# Patient Record
Sex: FEMALE | Race: WHITE | Employment: UNEMPLOYED | URBAN - METROPOLITAN AREA
[De-identification: names, ages, dates, MRNs, and addresses within clinical notes are randomized per-mention and may not be internally consistent; named-entity substitution may affect disease eponyms.]

---
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
## 2022-07-08 LAB
C. TRACHOMATIS, EXTERNAL RESULT: NEGATIVE
N. GONORRHOEAE, EXTERNAL RESULT: NEGATIVE

## 2022-08-19 LAB
ABO, EXTERNAL RESULT: NORMAL
HEP B, EXTERNAL RESULT: NEGATIVE
HIV, EXTERNAL RESULT: NEGATIVE
RH FACTOR, EXTERNAL RESULT: POSITIVE
RPR, EXTERNAL RESULT: NON REACTIVE
RUBELLA TITER, EXTERNAL RESULT: NORMAL

## 2023-02-10 LAB — GBS, EXTERNAL RESULT: POSITIVE

## 2023-03-03 ENCOUNTER — HOSPITAL ENCOUNTER (INPATIENT)
Age: 23
LOS: 4 days | Discharge: HOME | End: 2023-03-07
Attending: OBSTETRICS & GYNECOLOGY | Admitting: OBSTETRICS & GYNECOLOGY
Payer: COMMERCIAL

## 2023-03-03 ENCOUNTER — ANESTHESIA EVENT (OUTPATIENT)
Dept: LABOR AND DELIVERY | Age: 23
End: 2023-03-03

## 2023-03-03 ENCOUNTER — ANESTHESIA (OUTPATIENT)
Dept: LABOR AND DELIVERY | Age: 23
End: 2023-03-03

## 2023-03-03 PROBLEM — O13.3 GESTATIONAL HYPERTENSION W/O SIGNIFICANT PROTEINURIA IN 3RD TRIMESTER: Status: ACTIVE | Noted: 2023-03-03

## 2023-03-03 LAB
A/G RATIO: 1.2 (ref 1.1–2.2)
ABO/RH: NORMAL
ALBUMIN SERPL-MCNC: 3.8 G/DL (ref 3.4–5)
ALP BLD-CCNC: 146 U/L (ref 40–129)
ALT SERPL-CCNC: 15 U/L (ref 10–40)
AMPHETAMINE SCREEN, URINE: NORMAL
ANION GAP SERPL CALCULATED.3IONS-SCNC: 13 MMOL/L (ref 3–16)
ANTIBODY SCREEN: NORMAL
APTT: 27.9 SEC (ref 23–34.3)
AST SERPL-CCNC: 12 U/L (ref 15–37)
BARBITURATE SCREEN URINE: NORMAL
BENZODIAZEPINE SCREEN, URINE: NORMAL
BILIRUB SERPL-MCNC: 1.2 MG/DL (ref 0–1)
BUN BLDV-MCNC: 6 MG/DL (ref 7–20)
BUPRENORPHINE URINE: NORMAL
CALCIUM SERPL-MCNC: 9.3 MG/DL (ref 8.3–10.6)
CANNABINOID SCREEN URINE: NORMAL
CHLORIDE BLD-SCNC: 101 MMOL/L (ref 99–110)
CO2: 21 MMOL/L (ref 21–32)
COCAINE METABOLITE SCREEN URINE: NORMAL
CREAT SERPL-MCNC: <0.5 MG/DL (ref 0.6–1.1)
CREATININE URINE: 187 MG/DL (ref 28–259)
FENTANYL SCREEN, URINE: NORMAL
FIBRINOGEN: 599 MG/DL (ref 207–509)
GFR SERPL CREATININE-BSD FRML MDRD: >60 ML/MIN/{1.73_M2}
GLUCOSE BLD-MCNC: 137 MG/DL (ref 70–99)
HCT VFR BLD CALC: 39.6 % (ref 36–48)
HEMOGLOBIN: 13.5 G/DL (ref 12–16)
INR BLD: 1.08 (ref 0.87–1.14)
Lab: NORMAL
MCH RBC QN AUTO: 30.1 PG (ref 26–34)
MCHC RBC AUTO-ENTMCNC: 34 G/DL (ref 31–36)
MCV RBC AUTO: 88.4 FL (ref 80–100)
METHADONE SCREEN, URINE: NORMAL
OPIATE SCREEN URINE: NORMAL
OXYCODONE URINE: NORMAL
PDW BLD-RTO: 14.7 % (ref 12.4–15.4)
PH UA: 5
PHENCYCLIDINE SCREEN URINE: NORMAL
PLATELET # BLD: 213 K/UL (ref 135–450)
PMV BLD AUTO: 10.1 FL (ref 5–10.5)
POTASSIUM SERPL-SCNC: 3.7 MMOL/L (ref 3.5–5.1)
PROTEIN PROTEIN: 50 MG/DL
PROTEIN/CREAT RATIO: 0.3 MG/DL
PROTHROMBIN TIME: 14 SEC (ref 11.7–14.5)
RBC # BLD: 4.48 M/UL (ref 4–5.2)
SODIUM BLD-SCNC: 135 MMOL/L (ref 136–145)
TOTAL PROTEIN: 6.9 G/DL (ref 6.4–8.2)
WBC # BLD: 18.7 K/UL (ref 4–11)

## 2023-03-03 PROCEDURE — 82570 ASSAY OF URINE CREATININE: CPT

## 2023-03-03 PROCEDURE — 86901 BLOOD TYPING SEROLOGIC RH(D): CPT

## 2023-03-03 PROCEDURE — 85027 COMPLETE CBC AUTOMATED: CPT

## 2023-03-03 PROCEDURE — 86900 BLOOD TYPING SEROLOGIC ABO: CPT

## 2023-03-03 PROCEDURE — 85730 THROMBOPLASTIN TIME PARTIAL: CPT

## 2023-03-03 PROCEDURE — 80307 DRUG TEST PRSMV CHEM ANLYZR: CPT

## 2023-03-03 PROCEDURE — 6370000000 HC RX 637 (ALT 250 FOR IP): Performed by: ADVANCED PRACTICE MIDWIFE

## 2023-03-03 PROCEDURE — 85384 FIBRINOGEN ACTIVITY: CPT

## 2023-03-03 PROCEDURE — 86780 TREPONEMA PALLIDUM: CPT

## 2023-03-03 PROCEDURE — 80053 COMPREHEN METABOLIC PANEL: CPT

## 2023-03-03 PROCEDURE — 86850 RBC ANTIBODY SCREEN: CPT

## 2023-03-03 PROCEDURE — 84156 ASSAY OF PROTEIN URINE: CPT

## 2023-03-03 PROCEDURE — 1220000000 HC SEMI PRIVATE OB R&B

## 2023-03-03 PROCEDURE — 85610 PROTHROMBIN TIME: CPT

## 2023-03-03 RX ORDER — CARBOPROST TROMETHAMINE 250 UG/ML
250 INJECTION, SOLUTION INTRAMUSCULAR PRN
Status: DISCONTINUED | OUTPATIENT
Start: 2023-03-03 | End: 2023-03-07 | Stop reason: HOSPADM

## 2023-03-03 RX ORDER — SODIUM CHLORIDE 9 MG/ML
25 INJECTION, SOLUTION INTRAVENOUS PRN
Status: DISCONTINUED | OUTPATIENT
Start: 2023-03-03 | End: 2023-03-05

## 2023-03-03 RX ORDER — SODIUM CHLORIDE, SODIUM LACTATE, POTASSIUM CHLORIDE, AND CALCIUM CHLORIDE .6; .31; .03; .02 G/100ML; G/100ML; G/100ML; G/100ML
1000 INJECTION, SOLUTION INTRAVENOUS PRN
Status: DISCONTINUED | OUTPATIENT
Start: 2023-03-03 | End: 2023-03-05

## 2023-03-03 RX ORDER — METHYLERGONOVINE MALEATE 0.2 MG/ML
200 INJECTION INTRAVENOUS PRN
Status: DISCONTINUED | OUTPATIENT
Start: 2023-03-03 | End: 2023-03-07 | Stop reason: HOSPADM

## 2023-03-03 RX ORDER — ONDANSETRON 2 MG/ML
4 INJECTION INTRAMUSCULAR; INTRAVENOUS EVERY 6 HOURS PRN
Status: DISCONTINUED | OUTPATIENT
Start: 2023-03-03 | End: 2023-03-05

## 2023-03-03 RX ORDER — SODIUM CHLORIDE, SODIUM LACTATE, POTASSIUM CHLORIDE, CALCIUM CHLORIDE 600; 310; 30; 20 MG/100ML; MG/100ML; MG/100ML; MG/100ML
INJECTION, SOLUTION INTRAVENOUS CONTINUOUS
Status: DISCONTINUED | OUTPATIENT
Start: 2023-03-03 | End: 2023-03-03 | Stop reason: SDUPTHER

## 2023-03-03 RX ORDER — SODIUM CHLORIDE, SODIUM LACTATE, POTASSIUM CHLORIDE, CALCIUM CHLORIDE 600; 310; 30; 20 MG/100ML; MG/100ML; MG/100ML; MG/100ML
INJECTION, SOLUTION INTRAVENOUS CONTINUOUS
Status: DISCONTINUED | OUTPATIENT
Start: 2023-03-03 | End: 2023-03-05

## 2023-03-03 RX ORDER — SODIUM CHLORIDE 0.9 % (FLUSH) 0.9 %
5-40 SYRINGE (ML) INJECTION EVERY 12 HOURS SCHEDULED
Status: DISCONTINUED | OUTPATIENT
Start: 2023-03-03 | End: 2023-03-05

## 2023-03-03 RX ORDER — SODIUM CHLORIDE, SODIUM LACTATE, POTASSIUM CHLORIDE, AND CALCIUM CHLORIDE .6; .31; .03; .02 G/100ML; G/100ML; G/100ML; G/100ML
500 INJECTION, SOLUTION INTRAVENOUS PRN
Status: DISCONTINUED | OUTPATIENT
Start: 2023-03-03 | End: 2023-03-05

## 2023-03-03 RX ORDER — MISOPROSTOL 100 UG/1
800 TABLET ORAL PRN
Status: DISCONTINUED | OUTPATIENT
Start: 2023-03-03 | End: 2023-03-07 | Stop reason: HOSPADM

## 2023-03-03 RX ORDER — SODIUM CHLORIDE 0.9 % (FLUSH) 0.9 %
5-40 SYRINGE (ML) INJECTION PRN
Status: DISCONTINUED | OUTPATIENT
Start: 2023-03-03 | End: 2023-03-05

## 2023-03-03 RX ORDER — DOCUSATE SODIUM 100 MG/1
100 CAPSULE, LIQUID FILLED ORAL 2 TIMES DAILY
Status: DISCONTINUED | OUTPATIENT
Start: 2023-03-03 | End: 2023-03-05

## 2023-03-03 RX ADMIN — Medication 25 MCG: at 21:32

## 2023-03-03 RX ADMIN — Medication 25 MCG: at 17:19

## 2023-03-03 NOTE — H&P
Department of Obstetrics and Gynecology   Obstetrics History and Physical        CHIEF COMPLAINT:  Admitted for IOL secondary to gestational hypertension. She denies headache, visual disturbances, epigastric pain. She denies fevers, cough, Covid symptoms. She is considering NCB, but may desire an epidural at some point. HISTORY OF PRESENT ILLNESS:      The patient is a 24 y.o. female at 36w3d. OB History          1    Para        Term                AB        Living             SAB        IAB        Ectopic        Molar        Multiple        Live Births                Patient presents with a chief complaint as above and is being admitted for induction    Estimated Due Date: Estimated Date of Delivery: 3/9/23    PRENATAL CARE:    Complicated by: gestational hypertension, Anxiety w/o meds at this time. BMI 37.  GTT WNL x 4.  TWG 17# Positive GBS    PAST OB HISTORY:  OB History          1    Para        Term                AB        Living             SAB        IAB        Ectopic        Molar        Multiple        Live Births                    Past Medical History:        Diagnosis Date    Oral allergy syndrome      Past Surgical History:        Procedure Laterality Date    ADENOIDECTOMY      APPENDECTOMY      DENTAL SURGERY      wisdom teeth    FOOT SURGERY Left     tumor excision    FOOT SURGERY Left     tumor removal    CA EXC TUMOR SOFT TISSUE FOOT/TOE SUBFASC <1.5CM Left 2018    LEFT FOOT EXCISION OF DEEP TUMOR, APPLICATION OF TISSUE MEND GRAFT, BOSS CAST performed by Kathy Redd DPM at [place] [id number]       Allergies:  Nsaids    Social History:    Social History     Socioeconomic History    Marital status: Single     Spouse name: Not on file    Number of children: Not on file    Years of education: Not on file    Highest education level: Not on file   Occupational History    Not on file   Tobacco Use    Smoking status: Never    Smokeless tobacco: Never   Substance and Sexual Activity    Alcohol use: No    Drug use: No    Sexual activity: Not on file   Other Topics Concern    Not on file   Social History Narrative    Not on file     Social Determinants of Health     Financial Resource Strain: Not on file   Food Insecurity: Not on file   Transportation Needs: Not on file   Physical Activity: Not on file   Stress: Not on file   Social Connections: Not on file   Intimate Partner Violence: Not on file   Housing Stability: Not on file     Family History:   History reviewed. No pertinent family history. Medications Prior to Admission:  Medications Prior to Admission: Prenatal Vit-Fe Fumarate-FA (PRENATAL 1+1 PO), Take by mouth  famotidine (PEPCID) 20 MG tablet, Take 1 tablet by mouth daily for 7 days  norethindrone-ethinyl estradiol (JUNEL FE 1/20) 1-20 MG-MCG per tablet, Take 1 tablet by mouth daily (Patient not taking: Reported on 3/3/2023)    REVIEW OF SYSTEMS:    As per HPI    PHYSICAL EXAM:  Vitals:    03/03/23 1430 03/03/23 1438   BP: 134/84    Pulse: (!) 123    Resp: 16    Temp: 98.5 °F (36.9 °C)    TempSrc: Oral    SpO2: 98%    Weight:  260 lb (117.9 kg)   Height:  5' 10\" (1.778 m)     General appearance:  awake, alert, cooperative, no apparent distress, and appears stated age  Neurologic:  Awake, alert, oriented to name, place and time. Reflexes 2+, No clonus  Lungs:  No increased work of breathing, good air exchange  Abdomen:  Soft, non tender, gravid, consistent with her gestational age, EFW by Leopold's maneuver was 8#   Fetal heart rate:  Reassuring.   Pelvis:  Adequate pelvis  Cervix: 1/50/-2 medium consistency, posterior   Contraction frequency  Occasional mild  Membranes:  Intact    Labs: CBC:   Lab Results   Component Value Date/Time    WBC 18.7 03/03/2023 02:32 PM    RBC 4.48 03/03/2023 02:32 PM    HGB 13.5 03/03/2023 02:32 PM    HCT 39.6 03/03/2023 02:32 PM    MCV 88.4 03/03/2023 02:32 PM    MCH 30.1 03/03/2023 02:32 PM    MCHC 34.0 2023 02:32 PM    RDW 14.7 2023 02:32 PM     2023 02:32 PM    MPV 10.1 2023 02:32 PM     CMP:    Lab Results   Component Value Date/Time     2023 02:32 PM    K 3.7 2023 02:32 PM     2023 02:32 PM    CO2 21 2023 02:32 PM    BUN 6 2023 02:32 PM    CREATININE <0.5 2023 02:32 PM    GFRAA >60 2014 01:57 PM    AGRATIO 1.2 2023 02:32 PM    LABGLOM >60 2023 02:32 PM    GLUCOSE 137 2023 02:32 PM    PROT 6.9 2023 02:32 PM    LABALBU 3.8 2023 02:32 PM    CALCIUM 9.3 2023 02:32 PM    BILITOT 1.2 2023 02:32 PM    ALKPHOS 146 2023 02:32 PM    AST 12 2023 02:32 PM    ALT 15 2023 02:32 PM   PCR 0.267mg/dl    ASSESSMENT AND PLAN:    25 yo  at 39.1 wks gestation IOL secondary to [facility]  FHT Category 1  Positive GBS  Admit, routine labs, cytotec cervical ripening, PCN prophylaxis,  monitor, labor support as needed. Dr Emeka Robles notified of admit.

## 2023-03-04 ENCOUNTER — ANESTHESIA EVENT (OUTPATIENT)
Dept: LABOR AND DELIVERY | Age: 23
End: 2023-03-04
Payer: COMMERCIAL

## 2023-03-04 ENCOUNTER — ANESTHESIA (OUTPATIENT)
Dept: LABOR AND DELIVERY | Age: 23
End: 2023-03-04
Payer: COMMERCIAL

## 2023-03-04 PROBLEM — Z34.90 ENCOUNTER FOR INDUCTION OF LABOR: Status: ACTIVE | Noted: 2023-03-04

## 2023-03-04 PROBLEM — F41.9 ANXIETY: Status: ACTIVE | Noted: 2023-03-04

## 2023-03-04 PROBLEM — E66.9 OBESITY: Status: ACTIVE | Noted: 2023-03-04

## 2023-03-04 LAB — TOTAL SYPHILLIS IGG/IGM: NORMAL

## 2023-03-04 PROCEDURE — 6370000000 HC RX 637 (ALT 250 FOR IP): Performed by: ADVANCED PRACTICE MIDWIFE

## 2023-03-04 PROCEDURE — 2500000003 HC RX 250 WO HCPCS: Performed by: NURSE ANESTHETIST, CERTIFIED REGISTERED

## 2023-03-04 PROCEDURE — 51701 INSERT BLADDER CATHETER: CPT

## 2023-03-04 PROCEDURE — 1220000000 HC SEMI PRIVATE OB R&B

## 2023-03-04 PROCEDURE — 6360000002 HC RX W HCPCS: Performed by: ADVANCED PRACTICE MIDWIFE

## 2023-03-04 PROCEDURE — 2580000003 HC RX 258: Performed by: ADVANCED PRACTICE MIDWIFE

## 2023-03-04 RX ORDER — NALBUPHINE HCL 10 MG/ML
10 AMPUL (ML) INJECTION
Status: DISCONTINUED | OUTPATIENT
Start: 2023-03-04 | End: 2023-03-05

## 2023-03-04 RX ORDER — BUPIVACAINE HYDROCHLORIDE 2.5 MG/ML
INJECTION, SOLUTION EPIDURAL; INFILTRATION; INTRACAUDAL PRN
Status: DISCONTINUED | OUTPATIENT
Start: 2023-03-04 | End: 2023-03-05 | Stop reason: SDUPTHER

## 2023-03-04 RX ORDER — DIPHENHYDRAMINE HYDROCHLORIDE 50 MG/ML
INJECTION INTRAMUSCULAR; INTRAVENOUS
Status: DISCONTINUED
Start: 2023-03-04 | End: 2023-03-05

## 2023-03-04 RX ORDER — BUPIVACAINE HYDROCHLORIDE 2.5 MG/ML
INJECTION, SOLUTION EPIDURAL; INFILTRATION; INTRACAUDAL
Status: COMPLETED
Start: 2023-03-04 | End: 2023-03-04

## 2023-03-04 RX ORDER — DIPHENHYDRAMINE HYDROCHLORIDE 50 MG/ML
12.5 INJECTION INTRAMUSCULAR; INTRAVENOUS EVERY 6 HOURS PRN
Status: DISCONTINUED | OUTPATIENT
Start: 2023-03-04 | End: 2023-03-05

## 2023-03-04 RX ADMIN — Medication 1 MILLI-UNITS/MIN: at 10:39

## 2023-03-04 RX ADMIN — NALBUPHINE HYDROCHLORIDE 10 MG: 10 INJECTION, SOLUTION INTRAMUSCULAR; INTRAVENOUS; SUBCUTANEOUS at 22:35

## 2023-03-04 RX ADMIN — DIPHENHYDRAMINE HYDROCHLORIDE 12.5 MG: 50 INJECTION, SOLUTION INTRAMUSCULAR; INTRAVENOUS at 19:57

## 2023-03-04 RX ADMIN — SODIUM CHLORIDE, POTASSIUM CHLORIDE, SODIUM LACTATE AND CALCIUM CHLORIDE: 600; 310; 30; 20 INJECTION, SOLUTION INTRAVENOUS at 15:45

## 2023-03-04 RX ADMIN — Medication 2.5 MILLION UNITS: at 10:40

## 2023-03-04 RX ADMIN — BUPIVACAINE HYDROCHLORIDE 1 ML: 2.5 INJECTION, SOLUTION EPIDURAL; INFILTRATION; INTRACAUDAL; PERINEURAL at 16:54

## 2023-03-04 RX ADMIN — Medication 2.5 MILLION UNITS: at 19:45

## 2023-03-04 RX ADMIN — DEXTROSE MONOHYDRATE 5 MILLION UNITS: 5 INJECTION INTRAVENOUS at 06:25

## 2023-03-04 RX ADMIN — SODIUM CHLORIDE, POTASSIUM CHLORIDE, SODIUM LACTATE AND CALCIUM CHLORIDE: 600; 310; 30; 20 INJECTION, SOLUTION INTRAVENOUS at 06:24

## 2023-03-04 RX ADMIN — Medication 2.5 MILLION UNITS: at 23:33

## 2023-03-04 RX ADMIN — Medication 25 MCG: at 04:54

## 2023-03-04 RX ADMIN — Medication 15 ML/HR: at 17:10

## 2023-03-04 RX ADMIN — SODIUM CHLORIDE, POTASSIUM CHLORIDE, SODIUM LACTATE AND CALCIUM CHLORIDE: 600; 310; 30; 20 INJECTION, SOLUTION INTRAVENOUS at 18:01

## 2023-03-04 RX ADMIN — Medication 2.5 MILLION UNITS: at 15:34

## 2023-03-04 RX ADMIN — Medication 25 MCG: at 01:20

## 2023-03-04 ASSESSMENT — LIFESTYLE VARIABLES: SMOKING_STATUS: 0

## 2023-03-04 NOTE — ANESTHESIA PRE PROCEDURE
Department of Anesthesiology  Preprocedure Note       Name:  Kalee Chavira   Age:  23 y.o.  :  2000                                          MRN:  [id number]         Date:  3/4/2023      Surgeon: * Surgery not found *    Procedure:     Medications prior to admission:   Prior to Admission medications    Medication Sig Start Date End Date Taking? Authorizing Provider   Prenatal Vit-Fe Fumarate-FA (PRENATAL 1+1 PO) Take by mouth   Yes Historical Provider, MD   famotidine (PEPCID) 20 MG tablet Take 1 tablet by mouth daily for 7 days 9/25/18 10/2/18  Lindsay Diaz DPM   norethindrone-ethinyl estradiol (JUNEL ) 1-20 MG-MCG per tablet Take 1 tablet by mouth daily  Patient not taking: Reported on 3/3/2023    Historical Provider, MD       Current medications:    Current Facility-Administered Medications   Medication Dose Route Frequency Provider Last Rate Last Admin   • oxytocin (PITOCIN) 30 units in 500 mL infusion  1-20 aria-units/min IntraVENous Continuous EDISON Veras - CNSUREKHA 6 mL/hr at 23 1500 6 aria-units/min at 23 1500   • sodium chloride 0.9 % 200 mL with fentaNYL 500 mcg, bupivacaine 0.5% 50 mL (OB) epidural            • bupivacaine (PF) (MARCAINE) 0.25 % injection            • lactated ringers IV soln infusion   IntraVENous Continuous EDISON Reyes -  mL/hr at 23 1545 New Bag at 23 1545   • lactated ringers bolus  500 mL IntraVENous PRN Sandra Garay APRN - CNSUREKHA        Or   • lactated ringers bolus  1,000 mL IntraVENous PRN Sandra Garay APRN - CNM       • sodium chloride flush 0.9 % injection 5-40 mL  5-40 mL IntraVENous 2 times per day Sandra Garay APRN - CNM       • sodium chloride flush 0.9 % injection 5-40 mL  5-40 mL IntraVENous PRN Sandra Garay APRN - CNM       • 0.9 % sodium chloride infusion  25 mL IntraVENous PRN Sandra Garay APRN - CNM       • methylergonovine (METHERGINE) injection 200 mcg  200 mcg IntraMUSCular PRN  Cheli Eliel, APRN - CNM        carboprost (HEMABATE) injection 250 mcg  250 mcg IntraMUSCular PRN Cheli Julian, APRN - CNM        miSOPROStol (CYTOTEC) tablet 800 mcg  800 mcg Rectal PRN Cheli Julian, APRN - CNM        oxytocin (PITOCIN) 30 units in 500 mL infusion  87.3 aria-units/min IntraVENous Continuous PRN Cheli Julian, APRN - CNM        And    oxytocin (PITOCIN) 10 unit bolus from the bag  10 Units IntraVENous PRN Cheli Julian, APRN - CNM        ondansetron [facility]) injection 4 mg  4 mg IntraVENous Q6H PRN Cheli Julian, APRN - CNM        docusate sodium (COLACE) capsule 100 mg  100 mg Oral BID Cheli Eliel, APRN - CNM        hydrocortisone 2.5 % cream   Topical Q2H PRN Cheli Eliel, APRN - CNM        penicillin G potassium 2.5 million units in 0.9% sodium chloride 100 mL IVPB  2.5 Million Units IntraVENous Q4H Cheli Eliel, APRN - CNM   2.5 Million Units at 03/04/23 1534       Allergies:     Allergies   Allergen Reactions    Nsaids      Instructed to avoid NSAIDs r/t oral allergy syndrome per pt       Problem List:    Patient Active Problem List   Diagnosis Code    Back pain M54.9    Pain in both wrists M25.531, M25.532    Gestational hypertension w/o significant proteinuria in 3rd trimester O13.3    Encounter for induction of labor Z34.90       Past Medical History:        Diagnosis Date    Oral allergy syndrome        Past Surgical History:        Procedure Laterality Date    ADENOIDECTOMY      APPENDECTOMY      DENTAL SURGERY      wisdom teeth    FOOT SURGERY Left     tumor excision    FOOT SURGERY Left     tumor removal    GA EXC TUMOR SOFT TISSUE FOOT/TOE SUBFASC <1.5CM Left 09/25/2018    LEFT FOOT EXCISION OF DEEP TUMOR, APPLICATION OF TISSUE MEND GRAFT, BOSS CAST performed by Jf Green DPM at [address] History:    Social History     Tobacco Use    Smoking status: Never    Smokeless tobacco: Never   Substance Use Topics    Alcohol use: No                                Counseling given: Not Answered      Vital Signs (Current):   Vitals:    03/04/23 1112 03/04/23 1244 03/04/23 1400 03/04/23 1500   BP: 136/84 136/81 132/79 139/83   Pulse: 90 86 (!) 115 (!) 110   Resp: 16 16 16 16   Temp: 36.7 °C (98 °F)   36.7 °C (98.1 °F)   TempSrc: Oral   Axillary   SpO2:       Weight:       Height:                                                  BP Readings from Last 3 Encounters:   03/04/23 139/83   09/25/18 (!) 103/58   09/25/18 129/85       NPO Status:                                                                                 BMI:   Wt Readings from Last 3 Encounters:   03/03/23 260 lb (117.9 kg)   09/25/18 220 lb (99.8 kg) (99 %, Z= 2.21)*   09/07/18 215 lb (97.5 kg) (98 %, Z= 2.15)*     * Growth percentiles are based on CDC (Girls, 2-20 Years) data. Body mass index is 37.31 kg/m². CBC:   Lab Results   Component Value Date/Time    WBC 18.7 03/03/2023 02:32 PM    RBC 4.48 03/03/2023 02:32 PM    HGB 13.5 03/03/2023 02:32 PM    HCT 39.6 03/03/2023 02:32 PM    MCV 88.4 03/03/2023 02:32 PM    RDW 14.7 03/03/2023 02:32 PM     03/03/2023 02:32 PM       CMP:   Lab Results   Component Value Date/Time     03/03/2023 02:32 PM    K 3.7 03/03/2023 02:32 PM     03/03/2023 02:32 PM    CO2 21 03/03/2023 02:32 PM    BUN 6 03/03/2023 02:32 PM    CREATININE <0.5 03/03/2023 02:32 PM    GFRAA >60 11/12/2014 01:57 PM    AGRATIO 1.2 03/03/2023 02:32 PM    LABGLOM >60 03/03/2023 02:32 PM    GLUCOSE 137 03/03/2023 02:32 PM    PROT 6.9 03/03/2023 02:32 PM    CALCIUM 9.3 03/03/2023 02:32 PM    BILITOT 1.2 03/03/2023 02:32 PM    ALKPHOS 146 03/03/2023 02:32 PM    AST 12 03/03/2023 02:32 PM    ALT 15 03/03/2023 02:32 PM       POC Tests: No results for input(s): POCGLU, POCNA, POCK, POCCL, POCBUN, POCHEMO, POCHCT in the last 72 hours.     Coags:   Lab Results   Component Value Date/Time    PROTIME 14.0 03/03/2023 02:32 PM    INR 1.08 03/03/2023 02:32 PM    APTT 27.9 03/03/2023 02:32 PM       HCG (If Applicable): No results found for: PREGTESTUR, PREGSERUM, HCG, HCGQUANT     ABGs: No results found for: PHART, PO2ART, JSW8WNR, ZRD0VAK, BEART, B6CWUKSI     Type & Screen (If Applicable):  No results found for: LABABO, LABRH    Drug/Infectious Status (If Applicable):  No results found for: HIV, HEPCAB    COVID-19 Screening (If Applicable): No results found for: COVID19        Anesthesia Evaluation  Patient summary reviewed and Nursing notes reviewed no history of anesthetic complications:   Airway: Mallampati: II  TM distance: >3 FB   Neck ROM: full  Mouth opening: > = 3 FB   Dental:          Pulmonary:Negative Pulmonary ROS       (-) not a current smoker                          ROS comment: Seasonal allergies   Cardiovascular:  Exercise tolerance: good (>4 METS),   (+) hypertension (gestational ):,          Beta Blocker:  Not on Beta Blocker         Neuro/Psych:   (+) depression/anxiety  (anxiety)            GI/Hepatic/Renal: Neg GI/Hepatic/Renal ROS            Endo/Other:                      ROS comment: scoliosis Abdominal:             Vascular: Other Findings:           Anesthesia Plan      CSE     ASA 3 - emergent             Anesthetic plan and risks discussed with patient (risks/benefits of FREDDIE discussed with patient. Procedure explained, questions answered, written consent obtained).                         EDISON Moseley - KEISHA   3/4/2023

## 2023-03-04 NOTE — PROGRESS NOTES
Department of Obstetrics and Gynecology  Labor and Delivery   CNM Progress Note      SUBJECTIVE:  Pt coping well with contractions. Reports they are lower in abdomen and more intense    OBJECTIVE:      Fetal heart rate:       Baseline Heart Rate:  130        Accelerations:  present       Long Term Variability:  moderate       Decelerations:  absent         Contraction frequency: 1-3 minutes    Membranes:  Intact    Cervix:    Deferred until patient more uncomfortable    GBS:  Positive, receiving abx tx           ASSESSMENT & PLAN:    24 y.o.    OB History          1    Para        Term                AB        Living             SAB        IAB        Ectopic        Molar        Multiple        Live Births                 39w2d  1. FWB: reassuring  2. Continue current plan. 3. Continue titrating pitocin per protocol  4. Continue with position changes to help facilitate labor progress  5.  Re-evaluate in 3 hrs, or sooner if clinically indicated    EDISON Masterson CNM

## 2023-03-04 NOTE — PROGRESS NOTES
CATHY Garay, BRI updated on pt status. Pt feeling more uncomfortable. CNM wants abx to be started at this time.

## 2023-03-04 NOTE — PROGRESS NOTES
Department of Obstetrics and Gynecology  Labor and Delivery   CNM Progress Note      SUBJECTIVE: Pt reports tolerable cramping. Was able to rest some throughout the night. Ate breakfast and has been moving around the room.      OBJECTIVE:      Fetal heart rate:       Baseline Heart Rate:  145       Accelerations:  present       Long Term Variability:  moderate       Decelerations:  absent         Contraction frequency: 1-3 minutes    Membranes:  Intact    Cervix:         Dilation:  3 cm         Effacement:  75%         Station:  -2         Position:  mid             ASSESSMENT & PLAN:    23 y.o.    OB History          1    Para        Term                AB        Living             SAB        IAB        Ectopic        Molar        Multiple        Live Births                 39w2d  1. FWB: reassuring  2.  Continue current plan.   3. Discussed initiating IV pitocin to continue IOL process  4. Re-evaluate in 3-4 hours or sooner as clinically indicated  5. Stable blood pressures, no interventions necessary    Zenaida Blake, EDISON - CNM

## 2023-03-04 NOTE — ANESTHESIA PROCEDURE NOTES
CSE Block    Patient location during procedure: OB  Start time: 3/4/2023 4:41 PM  End time: 3/4/2023 5:10 PM  Reason for block: labor epidural  Staffing  Performed: resident/CRNA   Anesthesiologist: Leslie Desai MD  Resident/CRNA: Nilda Redmond APRN - CRNA  CSE  Patient position: sitting  Prep: ChloraPrep and site prepped and draped  Patient monitoring: continuous pulse ox and frequent blood pressure checks  Approach: midline  Provider prep: mask and sterile gloves  Spinal Needle  Needle type: pencil-tip   Needle gauge: 25 G  Needle length: 4.75 in  Epidural Needle  Injection technique: ARAM saline  Needle type: Tuohy   Needle gauge: 17 G  Needle length: 3.5 in  Needle insertion depth: 11 cm  Location: lumbar (1-5)  Catheter  Catheter type: side hole  Catheter size: 19 G  Catheter at skin depth: 16 cm  Test dose: negative (3ml + 2ml of lidcoaine 1.5% + epi 1:200k given incrementally)  Assessment  Hemodynamics: stable  Additional Notes  Patient in sitting position. Sterile prep and drape applied to back. Skin localization with 5ml of lidocaine 1%. LORT with NS, multiple rediretions encountering os. Patient repositioned in bed. Additional 3ml of lidocaine injected at same level. 2nd attempt with slight right paramedian approach with ARAM at 11cm. CSE with 25g pencan- return of clear free flowing csf. Epidural catheter advanced easily. Test dose given incrementally after negative aspirate from catheter. Sterile dressing applied.    Preanesthetic Checklist  Completed: patient identified, IV checked, site marked, risks and benefits discussed, surgical/procedural consents, equipment checked, pre-op evaluation, timeout performed, anesthesia consent given, oxygen available, monitors applied/VS acknowledged and blood product R/B/A discussed and consented

## 2023-03-04 NOTE — PROGRESS NOTES
Department of Obstetrics and Gynecology  Labor and Delivery   CNM Progress Note      SUBJECTIVE:  Pt getting comfortable with epidural. IV infiltrated and being replaced by RN. Pt initially wanted me to break her water after epidural placement but would prefer to rest first.     OBJECTIVE:      Fetal heart rate:       Baseline Heart Rate:  125        Accelerations:  present       Long Term Variability:  moderate       Decelerations:  absent         Contraction frequency: 1-4 minutes    Membranes:  Intact    Cervix:      Deferred    GBS:  Positive, receiving abx tx           ASSESSMENT & PLAN:  Gestational hypertension: mild range bps, asymptomatic  24 y.o.    OB History          1    Para        Term                AB        Living             SAB        IAB        Ectopic        Molar        Multiple        Live Births                 39w2d  1. FWB: reassuring  2. Continue current plan. 3. Continue titrating pitocin per protocol  4. Continue with position changes to help facilitate labor progress  5.  Re-evaluate in 2-3 hrs, or sooner if clinically indicated    EDISON Martin - BRI

## 2023-03-04 NOTE — PLAN OF CARE
Problem: Vaginal Birth or  Section  Goal: Fetal and maternal status remain reassuring during the birth process  Description:  Birth OB-Pregnancy care plan goal which identifies if the fetal and maternal status remain reassuring during the birth process  3/4/2023 0744 by Tobias James RN  Outcome: Progressing  3/3/2023 1943 by Rhea Fields RN  Outcome: Progressing     Problem: Pain  Goal: Verbalizes/displays adequate comfort level or baseline comfort level  3/4/2023 0744 by Tobias James RN  Outcome: Progressing  3/3/2023 1943 by Rhea Fields RN  Outcome: Progressing     Problem: Infection - Adult  Goal: Absence of infection during hospitalization  3/4/2023 0744 by Tobias James RN  Outcome: Progressing  3/3/2023 1943 by Rhea Fields RN  Outcome: Progressing     Problem: Safety - Adult  Goal: Free from fall injury  3/4/2023 0744 by Tobias James RN  Outcome: Progressing  3/3/2023 1943 by Rhea Fields RN  Outcome: Progressing     Problem: Chronic Conditions and Co-morbidities  Goal: Patient's chronic conditions and co-morbidity symptoms are monitored and maintained or improved  3/4/2023 0744 by Tobias James RN  Outcome: Progressing  3/3/2023 1943 by Rhea Fields RN  Outcome: Progressing

## 2023-03-04 NOTE — PROGRESS NOTES
S: Pt requesting SVE due to discomfort  O: SVE: 3/70/-2, medium  FHR: 140, mod variability, accels present, decels absent Category I  [place]: 1-3 mins  GBS positive  Membranes intact  A/P: Gestational hypertension, mild range Bps  Induction of labor    Discussed AROM, epidural, IV pain meds, continuing with management as is. Pt undecided at this time.      Continue titrating pitocin per protocol    Will re-evaluate in 3 hours or sooner as clinically indicated    EDISON Gibbs CNM

## 2023-03-05 PROBLEM — Z34.90 ENCOUNTER FOR INDUCTION OF LABOR: Status: RESOLVED | Noted: 2023-03-04 | Resolved: 2023-03-05

## 2023-03-05 PROCEDURE — 2500000003 HC RX 250 WO HCPCS: Performed by: NURSE ANESTHETIST, CERTIFIED REGISTERED

## 2023-03-05 PROCEDURE — 51701 INSERT BLADDER CATHETER: CPT

## 2023-03-05 PROCEDURE — 3E0DXGC INTRODUCTION OF OTHER THERAPEUTIC SUBSTANCE INTO MOUTH AND PHARYNX, EXTERNAL APPROACH: ICD-10-PCS | Performed by: ADVANCED PRACTICE MIDWIFE

## 2023-03-05 PROCEDURE — 10907ZC DRAINAGE OF AMNIOTIC FLUID, THERAPEUTIC FROM PRODUCTS OF CONCEPTION, VIA NATURAL OR ARTIFICIAL OPENING: ICD-10-PCS | Performed by: ADVANCED PRACTICE MIDWIFE

## 2023-03-05 PROCEDURE — 7200000001 HC VAGINAL DELIVERY

## 2023-03-05 PROCEDURE — 2500000003 HC RX 250 WO HCPCS

## 2023-03-05 PROCEDURE — 6360000002 HC RX W HCPCS: Performed by: ADVANCED PRACTICE MIDWIFE

## 2023-03-05 PROCEDURE — 3E033VJ INTRODUCTION OF OTHER HORMONE INTO PERIPHERAL VEIN, PERCUTANEOUS APPROACH: ICD-10-PCS | Performed by: ADVANCED PRACTICE MIDWIFE

## 2023-03-05 PROCEDURE — 2580000003 HC RX 258: Performed by: ADVANCED PRACTICE MIDWIFE

## 2023-03-05 PROCEDURE — 6370000000 HC RX 637 (ALT 250 FOR IP): Performed by: ADVANCED PRACTICE MIDWIFE

## 2023-03-05 PROCEDURE — 1220000000 HC SEMI PRIVATE OB R&B

## 2023-03-05 PROCEDURE — 0DQR0ZZ REPAIR ANAL SPHINCTER, OPEN APPROACH: ICD-10-PCS | Performed by: ADVANCED PRACTICE MIDWIFE

## 2023-03-05 RX ORDER — SODIUM CHLORIDE, SODIUM LACTATE, POTASSIUM CHLORIDE, CALCIUM CHLORIDE 600; 310; 30; 20 MG/100ML; MG/100ML; MG/100ML; MG/100ML
INJECTION, SOLUTION INTRAVENOUS CONTINUOUS
Status: DISCONTINUED | OUTPATIENT
Start: 2023-03-05 | End: 2023-03-07 | Stop reason: HOSPADM

## 2023-03-05 RX ORDER — SODIUM CHLORIDE 0.9 % (FLUSH) 0.9 %
5-40 SYRINGE (ML) INJECTION PRN
Status: DISCONTINUED | OUTPATIENT
Start: 2023-03-05 | End: 2023-03-07 | Stop reason: HOSPADM

## 2023-03-05 RX ORDER — LIDOCAINE HYDROCHLORIDE 10 MG/ML
INJECTION, SOLUTION INFILTRATION; PERINEURAL
Status: COMPLETED
Start: 2023-03-05 | End: 2023-03-05

## 2023-03-05 RX ORDER — FERROUS SULFATE 325(65) MG
325 TABLET ORAL 2 TIMES DAILY WITH MEALS
Status: DISCONTINUED | OUTPATIENT
Start: 2023-03-05 | End: 2023-03-07 | Stop reason: HOSPADM

## 2023-03-05 RX ORDER — ACETAMINOPHEN 500 MG
1000 TABLET ORAL EVERY 8 HOURS PRN
Status: DISCONTINUED | OUTPATIENT
Start: 2023-03-05 | End: 2023-03-07 | Stop reason: HOSPADM

## 2023-03-05 RX ORDER — BUPIVACAINE HYDROCHLORIDE 5 MG/ML
INJECTION, SOLUTION EPIDURAL; INTRACAUDAL PRN
Status: DISCONTINUED | OUTPATIENT
Start: 2023-03-05 | End: 2023-03-05 | Stop reason: SDUPTHER

## 2023-03-05 RX ORDER — MISOPROSTOL 100 UG/1
TABLET ORAL
Status: DISCONTINUED
Start: 2023-03-05 | End: 2023-03-05 | Stop reason: WASHOUT

## 2023-03-05 RX ORDER — DEXMEDETOMIDINE HYDROCHLORIDE 100 UG/ML
INJECTION, SOLUTION INTRAVENOUS PRN
Status: DISCONTINUED | OUTPATIENT
Start: 2023-03-05 | End: 2023-03-05 | Stop reason: SDUPTHER

## 2023-03-05 RX ORDER — OXYCODONE HYDROCHLORIDE 5 MG/1
5 TABLET ORAL EVERY 4 HOURS PRN
Status: DISCONTINUED | OUTPATIENT
Start: 2023-03-05 | End: 2023-03-07 | Stop reason: HOSPADM

## 2023-03-05 RX ORDER — LANOLIN 100 %
OINTMENT (GRAM) TOPICAL PRN
Status: DISCONTINUED | OUTPATIENT
Start: 2023-03-05 | End: 2023-03-07 | Stop reason: HOSPADM

## 2023-03-05 RX ORDER — SODIUM CHLORIDE 0.9 % (FLUSH) 0.9 %
5-40 SYRINGE (ML) INJECTION EVERY 12 HOURS SCHEDULED
Status: DISCONTINUED | OUTPATIENT
Start: 2023-03-05 | End: 2023-03-07 | Stop reason: HOSPADM

## 2023-03-05 RX ORDER — DOCUSATE SODIUM 100 MG/1
100 CAPSULE, LIQUID FILLED ORAL 2 TIMES DAILY
Status: DISCONTINUED | OUTPATIENT
Start: 2023-03-05 | End: 2023-03-07 | Stop reason: HOSPADM

## 2023-03-05 RX ORDER — SODIUM CHLORIDE 9 MG/ML
INJECTION, SOLUTION INTRAVENOUS PRN
Status: DISCONTINUED | OUTPATIENT
Start: 2023-03-05 | End: 2023-03-07 | Stop reason: HOSPADM

## 2023-03-05 RX ADMIN — LIDOCAINE HYDROCHLORIDE 100 MG: 10 INJECTION, SOLUTION INFILTRATION; PERINEURAL at 11:20

## 2023-03-05 RX ADMIN — Medication 2.5 MILLION UNITS: at 03:31

## 2023-03-05 RX ADMIN — NALBUPHINE HYDROCHLORIDE 10 MG: 10 INJECTION, SOLUTION INTRAMUSCULAR; INTRAVENOUS; SUBCUTANEOUS at 03:10

## 2023-03-05 RX ADMIN — BUPIVACAINE HYDROCHLORIDE 0.5 ML: 2.5 INJECTION, SOLUTION EPIDURAL; INFILTRATION; INTRACAUDAL; PERINEURAL at 03:27

## 2023-03-05 RX ADMIN — Medication 10 ML: at 20:49

## 2023-03-05 RX ADMIN — BENZOCAINE AND LEVOMENTHOL: 200; 5 SPRAY TOPICAL at 12:58

## 2023-03-05 RX ADMIN — BUPIVACAINE HYDROCHLORIDE 8 ML: 5 INJECTION, SOLUTION EPIDURAL; INTRACAUDAL; PERINEURAL at 05:54

## 2023-03-05 RX ADMIN — DOCUSATE SODIUM 100 MG: 100 CAPSULE, LIQUID FILLED ORAL at 20:48

## 2023-03-05 RX ADMIN — DEXMEDETOMIDINE HCL 20 MCG: 100 INJECTION INTRAVENOUS at 05:54

## 2023-03-05 RX ADMIN — SODIUM CHLORIDE, POTASSIUM CHLORIDE, SODIUM LACTATE AND CALCIUM CHLORIDE: 600; 310; 30; 20 INJECTION, SOLUTION INTRAVENOUS at 02:05

## 2023-03-05 RX ADMIN — ACETAMINOPHEN 1000 MG: 500 TABLET ORAL at 20:48

## 2023-03-05 RX ADMIN — DOCUSATE SODIUM 100 MG: 100 CAPSULE, LIQUID FILLED ORAL at 12:59

## 2023-03-05 RX ADMIN — ACETAMINOPHEN 1000 MG: 500 TABLET ORAL at 12:59

## 2023-03-05 RX ADMIN — WITCH HAZEL: 500 SOLUTION RECTAL; TOPICAL at 12:59

## 2023-03-05 RX ADMIN — Medication 2.5 MILLION UNITS: at 07:17

## 2023-03-05 RX ADMIN — Medication 166.7 ML: at 10:16

## 2023-03-05 ASSESSMENT — PAIN SCALES - GENERAL
PAINLEVEL_OUTOF10: 3
PAINLEVEL_OUTOF10: 5
PAINLEVEL_OUTOF10: 1

## 2023-03-05 ASSESSMENT — PAIN - FUNCTIONAL ASSESSMENT
PAIN_FUNCTIONAL_ASSESSMENT: ACTIVITIES ARE NOT PREVENTED

## 2023-03-05 ASSESSMENT — PAIN DESCRIPTION - LOCATION
LOCATION: BACK
LOCATION: BACK

## 2023-03-05 ASSESSMENT — PAIN DESCRIPTION - DESCRIPTORS
DESCRIPTORS: ACHING
DESCRIPTORS: SORE

## 2023-03-05 NOTE — PROGRESS NOTES
Department of Obstetrics and Gynecology  Labor and Delivery   CNM Progress Note      SUBJECTIVE:  Pt more relaxed, has been able to rest since epidural placement. OBJECTIVE:      Fetal heart rate:       Baseline Heart Rate:  145        Accelerations:  present       Long Term Variability:  moderate       Decelerations:  none         Contraction frequency: 1-4 minutes    Membranes:  Ruptured clear fluid, copious amount of fluid    Cervix:  Dilation:  4.5/70/-2    GBS:  Positive, receiving abx tx    ASSESSMENT & PLAN:  Gestational hypertension: mild range bps to normal bps, asymptomatic  24 y.o.    OB History          1    Para        Term                AB        Living             SAB        IAB        Ectopic        Molar        Multiple        Live Births                 39w2d  1 FWB: reassuring  2. AROM with patient's consent    3. Continue titrating pitocin per protocol  4. Continue with position changes to help facilitate labor progress  5.  Re-evaluate in 2-3 hrs, or sooner if clinically indicated    EDISON Siegel CNM

## 2023-03-05 NOTE — PROGRESS NOTES
Department of Obstetrics and Gynecology  Labor and Delivery   CNM Progress Note      SUBJECTIVE:  Pt feeling some vaginal pressure. Reports itching is better after nubain dosage. Catheter positionally causes some discomfort. OBJECTIVE:      Fetal heart rate:       Baseline Heart Rate:  135        Accelerations:  present       Long Term Variability:  moderate       Decelerations:  absent         Contraction frequency: 2-3 minutes    Membranes:  ruptured at 1907, clear    Afebrile    Cervix:         Dilation:  6 cm         Effacement:  80%         Station:  -2         Position:  mid  Unable to assess suture lines due to caput     GBS:  Positive, receiving abx tx    Bedside ultrasound:  Confirmed cephalic presentation     ASSESSMENT & PLAN:  Gestational hypertension: normal bps, asymptomatic  24 y.o.    OB History          1    Para        Term                AB        Living             SAB        IAB        Ectopic        Molar        Multiple        Live Births                 39w3d  1. FWB: reassuring  2. Continue current plan. 3. Continue titrating pitocin per protocol  4. Continue with position changes to help facilitate labor progress  5.  Re-evaluate in 3 hrs, or sooner if clinically indicated    EDISON Msaterson CNM

## 2023-03-05 NOTE — PROGRESS NOTES
Department of Obstetrics and Gynecology  Labor and Delivery   CNM Progress Note      SUBJECTIVE:  Pt comfortable with new epidural placement, reporting more intense vaginal pressure    OBJECTIVE:      Fetal heart rate:       Baseline Heart Rate:  125        Accelerations:  present       Long Term Variability:  moderate       Decelerations:  absent         Contraction frequency: 1-4 minutes    Membranes:  ruptured at 1907, clear  A febrile  Cervix:         Dilation:  9 cm         Effacement:  100%         Station:  0         Position:  anterior    GBS:  Positive, receiving abx tx           ASSESSMENT & PLAN:  Gestational hypertension: normal bps, asymptomatic  24 y.o.    OB History          1    Para        Term                AB        Living             SAB        IAB        Ectopic        Molar        Multiple        Live Births                 39w3d  1. FWB: reassuring  2. Continue current plan.    3. Continue with position changes to help facilitate labor progress  4 Re-evaluate in 2 hrs, or sooner if clinically indicated  [address] [facility], [facility] [place]

## 2023-03-05 NOTE — PROGRESS NOTES
Pts elma called out saying epidural was leaking. Upon RN assessment, the epidural catheter line has come disconnected. KIP Bah called to bedside to assess. CRNA explained epidural needs to be replaced. PT agreeable to new epidural placement.

## 2023-03-05 NOTE — ANESTHESIA PROCEDURE NOTES
CSE Block    Patient location during procedure: OB  Start time: 3/5/2023 3:20 AM  End time: 3/5/2023 3:47 AM  Reason for block: labor epidural  Staffing  Performed: resident/CRNA   Anesthesiologist: Sylvie Hayes MD  Resident/CRNA: EDISON Vences - CRNA  CSE  Patient position: sitting  Prep: ChloraPrep and site prepped and draped  Patient monitoring: continuous pulse ox and frequent blood pressure checks  Approach: midline  Provider prep: mask and sterile gloves  Spinal Needle  Needle type: pencil-tip   Needle gauge: 25 G  Needle length: 4.75 in  Epidural Needle  Injection technique: ARAM saline  Needle type: Tuohy   Needle gauge: 17 G  Needle length: 3.5 in  Needle insertion depth: 10 cm  Location: lumbar (1-5)  Catheter  Catheter type: side hole  Catheter size: 19 G  Catheter at skin depth: 16 cm  Test dose: negative (3ml of lidocaine 1.5%+ epi 1:200k given after negative aspirate from catheter)  Assessment  Hemodynamics: stable  Additional Notes  Patient in sitting position. Exisiting epidural dc'd with tip intact. Sterile prep x 2  and drape applied to back. Skin localization with 6ml of lidocaine 1%. LORT with NS one level above previous insertion site. Multiple redirections, CSE with 25g pencan- return of clear free flowing csf. Epidural catheter advanced easily. Test dose given incrementally after negative aspirate from catheter. Sterile dressing applied. Patient tolerated procedure well.    Preanesthetic Checklist  Completed: patient identified, IV checked, site marked, risks and benefits discussed, surgical/procedural consents, equipment checked, pre-op evaluation, timeout performed, anesthesia consent given, oxygen available, monitors applied/VS acknowledged and blood product R/B/A discussed and consented

## 2023-03-05 NOTE — L&D DELIVERY NOTE
Department of Obstetrics and Gynecology  Spontaneous Vaginal Delivery Note    Labor & Delivery Summary  Dilation Complete Date: 23  Dilation Complete Time: 0700    Pre-operative Diagnosis:  Induced labor secondary to gestational hypertension    Post-operative Diagnosis:  Living  infant(s)    Procedure:  Spontaneous vaginal delivery    Surgeon:   Hayden/Sidney    Information for the patient's :  Cait, Baby Girl Dana Rdz [[id number]]        Anesthesia:  epidural anesthesia    Estimated blood loss:  275    Specimen:  Placenta not sent to pathology     Cord blood sent No    Complications:  hypertension, 3rd degree laceration    Condition:  mother and infant stable in room    Details of Procedure: The patient is a 24 y.o. female at 38w3d   OB History          1    Para        Term                AB        Living             SAB        IAB        Ectopic        Molar        Multiple        Live Births                Dana Rdz is a 24 yr old  at 43 weeks 3 days  who was admitted for gestational hypertension. She received the following interventions: vaginal Cytotec, pitocin, AROM. She was known to be GBS positive and did receive antibiotic prophylaxis. The patient progressed well once in active phase of labor, did receive an epidural that needed to be replaced, became complete and started to push. After pushing for 2 hrs 42 minutes in a variety of positions with good effort, the fetal head was at the perineum. Infant was delivered atraumatically and placed on mother abdomen. Cord was clamped after cessation of pulsation and cut by maternal grandmother. The delivery of the placenta was spontaneous. The perineum and vagina were explored and a third (3a) degree laceration was repaired in standard fashion by Dr. Zenaida Kent with interrupted stitches, and remaining second degree and bilateral labial were repaired by myself, Shoaib Williamson CNM, with 3-0 vicryl and 4-0 vicryl, respectively.  A Fatuma presents today for her OB visit.    Patient would like communication of their results via:     Cell Phone:   Telephone Information:   Mobile [phone]     Okay to leave a message containing results? Yes    Patient's current [facility] status: Active.     Ketones: negative    Chaperone needed:  No   midline periurethral was left unrepaired due to hemostasis. APGARS 8/9. Pitocin was bolused per facility protocol after placenta expulsion. Mother and infant in stable condition, bonding and nursing.     Emeli Langston, EDISON - BRI

## 2023-03-05 NOTE — PROGRESS NOTES
Dillon Noriega CNM at bedside for SVE. CNM confirmed vertex presentation via [address] [facility]. 6/80/-2.

## 2023-03-05 NOTE — PROGRESS NOTES
Department of Obstetrics and Gynecology  Labor and Delivery   CNM Progress Note      SUBJECTIVE: Pt comfortable with redose and feeling some vaginal pressure. OBJECTIVE:      Fetal heart rate:       Baseline Heart Rate:  125        Accelerations:  present       Long Term Variability:  moderate       Decelerations:  absent         Contraction frequency: 2-4 minutes    Membranes:ruptured at 1907, clear  Afebrile    Cervix:         Dilation:  Complete         Effacement:  100%         Station:  +1         Position:  anterior    GBS:  Positive, receiving abx tx. 6 doses total, 7th to be hung           ASSESSMENT & PLAN:  Gestational hypertension: normal to mild range bps, asymptomatic  24 y.o.    OB History          1    Para        Term                AB        Living             SAB        IAB        Ectopic        Molar        Multiple        Live Births                 39w3d  1. FWB: reassuring  2. Continue current plan. 3. Start coached pushing efforts  4. Will stay at bedside to continually monitor progress  5. Anticipate       Communication with OB MD:   Dr Simi Baez in house, informed her that pt was complete and we were going to start pushing.  Will update her as clinically necessary    Emeli Console, APRN - CNM

## 2023-03-05 NOTE — PROGRESS NOTES
Department of Obstetrics and Gynecology  Labor and Delivery   CNM Progress Note      SUBJECTIVE: Pt still comfortable with epidural. Reports benadryl did not really help with post epidural itching she is experiencing    OBJECTIVE:      Fetal heart rate:       Baseline Heart Rate:  145        Accelerations:  absent       Long Term Variability:  moderate       Decelerations:  absent         Contraction frequency: 1-4 minutes    Membranes:  ruptured at 1907, clear    Cervix:  SVE at 2132 by RN /-    GBS:  Positive, receiving abx tx           ASSESSMENT & PLAN:  Gestational hypertension: normal bps, asymptomatic  24 y.o.    OB History          1    Para        Term                AB        Living             SAB        IAB        Ectopic        Molar        Multiple        Live Births                 39w2d  1. FWB: reassuring  2. Continue current plan. 3. Continue titrating pitocin per protocol  4. Continue with position changes to help facilitate labor progress  5. Re-evaluate in 3 hrs, or sooner if clinically indicated  6.  Encouraged RN to obtain nubain order from CRNA for itching, if currently in [address]

## 2023-03-06 LAB
HCT VFR BLD CALC: 35.7 % (ref 36–48)
HEMOGLOBIN: 12.1 G/DL (ref 12–16)
MCH RBC QN AUTO: 30.5 PG (ref 26–34)
MCHC RBC AUTO-ENTMCNC: 34 G/DL (ref 31–36)
MCV RBC AUTO: 89.8 FL (ref 80–100)
PDW BLD-RTO: 14.7 % (ref 12.4–15.4)
PLATELET # BLD: 190 K/UL (ref 135–450)
PMV BLD AUTO: 9.9 FL (ref 5–10.5)
RBC # BLD: 3.97 M/UL (ref 4–5.2)
WBC # BLD: 16.1 K/UL (ref 4–11)

## 2023-03-06 PROCEDURE — 6370000000 HC RX 637 (ALT 250 FOR IP): Performed by: STUDENT IN AN ORGANIZED HEALTH CARE EDUCATION/TRAINING PROGRAM

## 2023-03-06 PROCEDURE — 1220000000 HC SEMI PRIVATE OB R&B

## 2023-03-06 PROCEDURE — 85027 COMPLETE CBC AUTOMATED: CPT

## 2023-03-06 PROCEDURE — 36415 COLL VENOUS BLD VENIPUNCTURE: CPT

## 2023-03-06 PROCEDURE — 6370000000 HC RX 637 (ALT 250 FOR IP): Performed by: ADVANCED PRACTICE MIDWIFE

## 2023-03-06 RX ADMIN — ACETAMINOPHEN 1000 MG: 500 TABLET ORAL at 04:27

## 2023-03-06 RX ADMIN — ACETAMINOPHEN 1000 MG: 500 TABLET ORAL at 22:05

## 2023-03-06 RX ADMIN — OXYCODONE HYDROCHLORIDE 5 MG: 5 TABLET ORAL at 05:22

## 2023-03-06 RX ADMIN — OXYCODONE HYDROCHLORIDE 5 MG: 5 TABLET ORAL at 18:42

## 2023-03-06 RX ADMIN — OXYCODONE HYDROCHLORIDE 5 MG: 5 TABLET ORAL at 01:20

## 2023-03-06 RX ADMIN — DOCUSATE SODIUM 100 MG: 100 CAPSULE, LIQUID FILLED ORAL at 09:15

## 2023-03-06 RX ADMIN — OXYCODONE HYDROCHLORIDE 5 MG: 5 TABLET ORAL at 09:15

## 2023-03-06 ASSESSMENT — PAIN DESCRIPTION - DESCRIPTORS
DESCRIPTORS: ACHING;SHARP
DESCRIPTORS: ACHING;CRAMPING;SORE
DESCRIPTORS: DISCOMFORT
DESCRIPTORS: ACHING;CRAMPING;SORE

## 2023-03-06 ASSESSMENT — PAIN SCALES - GENERAL
PAINLEVEL_OUTOF10: 5
PAINLEVEL_OUTOF10: 7
PAINLEVEL_OUTOF10: 4
PAINLEVEL_OUTOF10: 2
PAINLEVEL_OUTOF10: 2
PAINLEVEL_OUTOF10: 4

## 2023-03-06 ASSESSMENT — PAIN - FUNCTIONAL ASSESSMENT
PAIN_FUNCTIONAL_ASSESSMENT: ACTIVITIES ARE NOT PREVENTED

## 2023-03-06 ASSESSMENT — PAIN DESCRIPTION - LOCATION
LOCATION: ABDOMEN;BACK;PERINEUM
LOCATION: BACK;PERINEUM
LOCATION: ABDOMEN
LOCATION: ABDOMEN;PERINEUM;BACK

## 2023-03-06 ASSESSMENT — PAIN DESCRIPTION - ORIENTATION: ORIENTATION: LOWER

## 2023-03-06 NOTE — ANESTHESIA POSTPROCEDURE EVALUATION
Department of Anesthesiology  Postprocedure Note    Patient: Vijaya Medina  MRN: [id number]  YOB: 2000  Date of evaluation: 3/6/2023      Procedure Summary     Date: 03/04/23 Room / Location:     Anesthesia Start: 1631 Anesthesia Stop: 03/05/23 1011    Procedure: Labor Analgesia Diagnosis:     Scheduled Providers:  Responsible Provider: Lydia Frey MD    Anesthesia Type: CSE ASA Status: 3 - Emergent          Anesthesia Type: No value filed. Jesus Manuel Phase I: Jesus Manuel Score: 9    Jesus Manuel Phase II: Jesus Manuel Score: 9      Anesthesia Post Evaluation    Patient location during evaluation: bedside  Patient participation: complete - patient participated  Level of consciousness: awake and alert  Pain score: 2  Airway patency: patent  Nausea & Vomiting: no vomiting and no nausea  Complications: no  Cardiovascular status: hemodynamically stable  Respiratory status: acceptable and room air  Hydration status: stable  Comments: S/P vaginal delivery 3-5-3 with epidural analgesia. No reported or apparent anesthesia complications. VSS.     BP: 116/82, Pain 0-10: Pain Level: 2, Location: perineal, bilat labial;     Lab Results   Component Value Date    WBC 16.1 (H) 03/06/2023    HGB 12.1 03/06/2023    HCT 35.7 (L) 03/06/2023    MCV 89.8 03/06/2023     03/06/2023

## 2023-03-06 NOTE — LACTATION NOTE
This note was copied from a baby's chart. Lactation Progress Note      Data:    Follow up consult for primip on day 1 pp with an infant born at 39.0 weeks gestation. Infant is currently in SCN on bili lights. SCN RN requesting consult to set MOB up with a breast pump. MOB reports infant has been feeding well but at last feed infant not interested in breast feeding. MOB noted to have widely spaced breast with the right side being smaller. MOB states that she did not notice any changes to the breasts during pregnancy. Feeding Method: Breast feeding 92/41 min over 10 latches since birth. Lactation involved. First time breastfeeding mom  Urine output: established   Stool output: established  Percent weight change from birth:  -2%     Glucoses followed for maternal hx of glucose intolerance: 48, 48, 51, 54         Action:    Introduced self & ensured name & lactation # is on whiteboard in room. Educated MOB about breast pump;  the reason for pumping, how to use breast pump, what to expect, how to clean breast pump parts, and how to feed expressed colostrum back to infant. Set up pump, checked flange size, and observed the first few minutes of session. All questions answered & MOB encouraged to call for lactation support as needed. Response:    MOB verbalized an understanding of education provided and will call for assistance as needed.

## 2023-03-06 NOTE — PLAN OF CARE
Problem: Postpartum  Goal: Experiences normal postpartum course  Description:  Postpartum OB-Pregnancy care plan goal which identifies if the mother is experiencing a normal postpartum course  3/5/2023 193 by Nirali Babb RN  Outcome: Progressing  3/5/2023 1243 by Maxwell Rodriguez RN  Outcome: Progressing  Goal: Appropriate maternal -  bonding  Description:  Postpartum OB-Pregnancy care plan goal which identifies if the mother and  are bonding appropriately  3/5/2023 193 by Nirali Babb RN  Outcome: Progressing  3/5/2023 1243 by Maxwell Rodriguez RN  Outcome: Progressing  Goal: Establishment of infant feeding pattern  Description:  Postpartum OB-Pregnancy care plan goal which identifies if the mother is establishing a feeding pattern with their   3/5/2023 1930 by Nirali Babb RN  Outcome: Progressing  3/5/2023 1243 by Maxwell Rodriguez RN  Outcome: Progressing  Goal: Incisions, wounds, or drain sites healing without S/S of infection  3/5/2023 1930 by Nirali Babb RN  Outcome: Progressing  3/5/2023 1243 by Maxwell Rodriguez RN  Outcome: Progressing     Problem: Pain  Goal: Verbalizes/displays adequate comfort level or baseline comfort level  3/5/2023 1930 by Nirali Babb RN  Outcome: Progressing  3/5/2023 1243 by Maxwell Rodriguez RN  Outcome: Progressing     Problem: Infection - Adult  Goal: Absence of infection at discharge  3/5/2023 1930 by Nirali Babb RN  Outcome: Progressing  3/5/2023 1243 by Maxwell Rodriguez RN  Outcome: Progressing  Goal: Absence of infection during hospitalization  3/5/2023 1930 by Nirali Babb RN  Outcome: Progressing  3/5/2023 1243 by Maxwell Rodriguez RN  Outcome: Progressing  Goal: Absence of fever/infection during anticipated neutropenic period  3/5/2023 1930 by Nirali Babb RN  Outcome: Progressing  3/5/2023 1243 by Maxwell Rodriguez RN  Outcome: Progressing     Problem: Safety - Adult  Goal: Free from fall injury  3/5/2023 1930 by Nirali Babb RN  Outcome: Progressing  3/5/2023 1243 by Mauricia Apley, RN  Outcome: Progressing     Problem: Discharge Planning  Goal: Discharge to home or other facility with appropriate resources  3/5/2023 1930 by Everton Castillo RN  Outcome: Progressing  3/5/2023 1243 by Mauricia Apley, RN  Outcome: Progressing     Problem: Chronic Conditions and Co-morbidities  Goal: Patient's chronic conditions and co-morbidity symptoms are monitored and maintained or improved  3/5/2023 1930 by Everton Castillo RN  Outcome: Progressing  3/5/2023 1243 by Mauricia Apley, RN  Outcome: Progressing

## 2023-03-06 NOTE — LACTATION NOTE
This note was copied from a baby's chart. Lactation Progress Note      Data:    F/U on primip breast feeder. Mob states that she is struggling more on the right breast. Right breast noted to be much smaller. Mob states that she can express more colostrum from the smaller breast. Denies hx of PCOS. Action: Assisted with good position at the right breast. Encouraged to express colostrum. Baby then rooting and a good deep latch was achieved with SRS and AS. Mob reports that the latch is comfortable. Encouraged to continue to offer both breasts but it is possible that the right breast may not produce as much as the left. Breast feeding education reviewed. [address] [facility] number on board and encouraged to call for f/u prn. Response: Pleased with breast feed. Verbalized and demonstrated understanding.

## 2023-03-06 NOTE — LACTATION NOTE
This note was copied from a baby's chart. Lactation Progress Note      Data:   MOB requests f/u support with latching, states struggling to get baby latched. Observed mother offering the breast in cradle position, and infant struggling to latch. Action: Gave tips to improve positioning and breast support, to support SIOBHAN to the breast. Encouraged to hold baby closer to the breast and more belly to belly and nose to nipple. Shown cross cradle hold, and encouraged breast support. Mother was very receptive and able to position baby well to the breast and infant rooting with wide open mouth. Encouraged breast support, and to angle nipple upward toward infant's nose or towards the back roof of the baby's mouth instead of downward towards infants tongue when latching. Infant rooting with wide open mouth, SIOBHAN achieved easily on first attempt with cross cradle position, with SRS and AS. Mother confirms that the latch is comfortable. After few minutes of sustained good latch and sucking. Shown mother how she can transition from cross cradle to cradle hold now that her baby is latched deeply, without disturbing the latch. Mother transitioned to cradle position well and infant maintaining the latch well. Mother reports much comfort and pleased with how well infant is doing. Breast feeding education reviewed. Name and number remains on whiteboard and reminded parents of LC's hours. Encouraged to call for f/u support prn. Response: Verbalized understanding of teaching provided and pleased with comfortable latch. Will call for f/u support prn.

## 2023-03-06 NOTE — PROGRESS NOTES
Department of Obstetrics and Gynecology  Labor and Delivery  Attending Post Partum Progress Note      SUBJECTIVE:  Denies c/o. Ambulating and voiding w/o difficulty. Tolerating a regular diet. Bleeding=menses. Pain well controlled w/ Tylenol and Roxicodone d/t ibuprofen allergy. Pt thinks she may desire d/c home today. Discussed infant needing d/c as well. Recommended staying another night for breastfeeding support. Infant latches well to left breast, but not the right breast. Left breast is sore. Has met w/ lactation twice.      OBJECTIVE:      Vitals:  /82   Pulse 79   Temp 98.2 °F (36.8 °C) (Oral)   Resp 18   Ht 5' 10\" (1.778 m)   Wt 260 lb (117.9 kg)   SpO2 99%   Breastfeeding Unknown   BMI 37.31 kg/m²     ABDOMEN:  FF @ u/1  GENITAL/URINARY:  well-approximated    DATA:    CBC:    Lab Results   Component Value Date/Time    WBC 16.1 2023 07:04 AM    RBC 3.97 2023 07:04 AM    HGB 12.1 2023 07:04 AM    HCT 35.7 2023 07:04 AM    MCV 89.8 2023 07:04 AM    RDW 14.7 2023 07:04 AM     2023 07:04 AM       ASSESSMENT & PLAN:      PPD 1 s/p   GHTN - asymptomatic, mostly normal BPs, 1 mild range in last 24hrs  Meeting PP milestones  Stable, breastfeeding female infant  Continue PP plan of care  May d/c home this afternoon/evening if pt desires

## 2023-03-06 NOTE — LACTATION NOTE
This note was copied from a baby's chart.  Lactation Progress Note      Data:     RN requests initial consult to assess latch. MOB has infant STS with her in cradle hold offering the breast. Infant is rooting.     Action: Introduced self as LC on for this afternoon and offered support. Reviewed importance of SIOBHAN, and gave tips to achieve. Shown cross cradle positioning, encouraging to hold baby close to the breast, belly to belly and nose to nipple. Shown how to support the breast to encourage SIOBHAN. Encouraged to hand express colostrum for infant as needed to encourage latching. Shown how to hand express. Few large drops expressed. Infant rooting with wide open mouth, SIOBHAN achieved with SRS. Mother confirms that the latch is comfortable. Gave reassurance and praise of SIOBHAN observed. Educated on how a good latch should look and feel, and instructed how to break the suction of the latch if the latch is ever shallow, or causing discomfort. Explained that the latch should feel comfortable without pinching or pain. Instructed that nipple should be rounded when baby releases from the breast without creasing. Breast feeding guide booklet provided and reviewed with parents. Educated on what to expect with breast feeding over the next 24-48 hours including breast care, breast feeding positions and latch, how milk production works and types of milk mother will produce, educated on signs of hunger, satiety, and expected  feeding behaviors, and how to know that baby is getting enough at the breast including daily goals for infant feedings, output, and weight trends. Encouraged to offer the breast when infant first begins to wake and show early hunger cues, and every 2-3 hours if baby is sleepy and without feeding cues. Gave tips to wake sleepy baby as needed, and encouraged much hand expression and STS contact with attempts to offer the breast. Also, reviewed what to expect with cluster feeding and the important role it  plays on milk supply. Instructed that baby should have a minimum of 8-12 good feedings in a 24 hour period after the first DOL. Encouraged exclusive breast feeding, educating on benefits, and AAP and WHO breast feeding recommendations. Encouraged to wait latching and milk supply are well established before introducing pacifiers, pumping, or offering bottles of EBM unless medical indication were to arise sooner. Educated on lactation services available during hospital stay and after discharge home and how to contact. LC observed the first 20 minutes of good feeding before LC was called to another room, infant continues nursing well. Name and number provided on whiteboard. Encouraged to call for [facility] to assess latch and for f/u support prn. Response: Verbalized understanding of teaching provided and pleased with comfortable latch. Infant continues nursing well and parents deny any questions or concerns. Will call for f/u support prn.

## 2023-03-06 NOTE — LACTATION NOTE
This note was copied from a baby's chart. Lactation Progress Note      Data:    Follow up consult for primip on day 1 pp with an infant born at 39.0 weeks gestation. Infant is currently in SCN on bili lights. MOB was set up with a breast pump at last consult & collected only a couple small drops of colostrum. MOB noted to have widely spaced breast with the right side being smaller. MOB states that she did not notice any changes to the breasts during pregnancy. This consult was to assist MOB with latching infant. Feeding Method: Breast feeding 92/41 min over 10 latches since birth. Lactation involved. First time breastfeeding mom  Urine output: established   Stool output: established  Percent weight change from birth:  -2%     Glucoses followed for maternal hx of glucose intolerance: 48, 48, 51, 54     Action:    Educated MOB about cross cradle position & football position, how to support infants head, and steps for a SIOBHAN. Assisted MOB get infant into cross cradle position at left breast. Infant opened mouth wide & appeared to get a SIOBHAN but infant only did 1-2 sucks before coming off the breast. Suggested MOB hand express drops of colostrum to try to entice infant to maintain latch. Infant still on & off the breast. This happened for 10-15 minutes. Digital suck training revealed infant very uncoordinated & much stimulation needed to get infant to suck, no SRS noted. Provided MOB with much encouragement to keep trying & to keep using breast pump every 2-3 hours. All questions answered & MOB encouraged to call for lactation support as needed. Response:    MOB verbalized an understanding of education provided and will call for assistance as needed.

## 2023-03-07 VITALS
HEIGHT: 70 IN | WEIGHT: 260 LBS | DIASTOLIC BLOOD PRESSURE: 85 MMHG | BODY MASS INDEX: 37.22 KG/M2 | RESPIRATION RATE: 16 BRPM | TEMPERATURE: 98 F | OXYGEN SATURATION: 99 % | SYSTOLIC BLOOD PRESSURE: 127 MMHG | HEART RATE: 94 BPM

## 2023-03-07 PROCEDURE — 6370000000 HC RX 637 (ALT 250 FOR IP): Performed by: STUDENT IN AN ORGANIZED HEALTH CARE EDUCATION/TRAINING PROGRAM

## 2023-03-07 PROCEDURE — 6370000000 HC RX 637 (ALT 250 FOR IP): Performed by: ADVANCED PRACTICE MIDWIFE

## 2023-03-07 RX ORDER — ACETAMINOPHEN 500 MG
1000 TABLET ORAL EVERY 8 HOURS PRN
Qty: 120 TABLET | Refills: 3 | COMMUNITY
Start: 2023-03-07

## 2023-03-07 RX ORDER — PSEUDOEPHEDRINE HCL 30 MG
100 TABLET ORAL 2 TIMES DAILY
COMMUNITY
Start: 2023-03-07

## 2023-03-07 RX ADMIN — DOCUSATE SODIUM 100 MG: 100 CAPSULE, LIQUID FILLED ORAL at 07:59

## 2023-03-07 RX ADMIN — DOCUSATE SODIUM 100 MG: 100 CAPSULE, LIQUID FILLED ORAL at 00:05

## 2023-03-07 RX ADMIN — ACETAMINOPHEN 1000 MG: 500 TABLET ORAL at 07:59

## 2023-03-07 RX ADMIN — ACETAMINOPHEN 1000 MG: 500 TABLET ORAL at 15:57

## 2023-03-07 RX ADMIN — WITCH HAZEL: 500 SOLUTION RECTAL; TOPICAL at 16:09

## 2023-03-07 RX ADMIN — OXYCODONE HYDROCHLORIDE 5 MG: 5 TABLET ORAL at 00:05

## 2023-03-07 ASSESSMENT — PAIN DESCRIPTION - DESCRIPTORS
DESCRIPTORS: SORE
DESCRIPTORS: DULL

## 2023-03-07 ASSESSMENT — PAIN SCALES - GENERAL
PAINLEVEL_OUTOF10: 3
PAINLEVEL_OUTOF10: 2
PAINLEVEL_OUTOF10: 2

## 2023-03-07 ASSESSMENT — PAIN DESCRIPTION - LOCATION
LOCATION: PERINEUM
LOCATION: ABDOMEN
LOCATION: PERINEUM

## 2023-03-07 ASSESSMENT — PAIN - FUNCTIONAL ASSESSMENT
PAIN_FUNCTIONAL_ASSESSMENT: ACTIVITIES ARE NOT PREVENTED

## 2023-03-07 ASSESSMENT — PAIN DESCRIPTION - ORIENTATION: ORIENTATION: LOWER

## 2023-03-07 NOTE — FLOWSHEET NOTE
Discharge Phone Call    Patient Name: Naya Morse     [facility] Care Provider: Leisa Elizabeth MD Discharge Date: 3/7/2023    Disposition of baby:    Phone Number: [phone] (home)     Attempts to Contact:  Date:    Caller  Date:    Caller  Date:    Caller    Information for the patient's :  Carmela Officer [[id number]]   Delivery Method: Vaginal, Spontaneous     1. Now that you are at home is your pain being well controlled? Y/N   If no, instruct to call       provider. 2. Are you breastfeeding? Y/N    Do you need any extra support from our lactation staff? Y/N    If yes, provide number for lactation. 3. Have you made or already had your first appointment with the baby's doctor? Y/N   If no, do      you know when to schedule it? Y/N    4. Have you scheduled your follow-up appointment? Y/N  If no, do you know when to schedule       it? Y/N   If no, they can find it on printed discharge instructions. 5. Did staff discuss safe sleep during your stay? Y/N   6. Did we explain things in a way you could understand? Y/N  7. Were we respectful of your preferences for labor and birth and include you in the plan of       care? Y/N  If no, please explain _______________________________________________  8. Is there anyone in particular you would like to mention who provided care for you? _______      _________________________________________________________________________     9. Were you given a Post-Birth Warning Signs handout? Y/N  Do you have it somewhere      easily accessible? Y/N  If no, please send them a copy and ask them to put it somewhere      easily found. 10. Have you been crying excessively, having anger or mood swings that feel out of control, or       feel like you can't cope with caring for yourself or baby? Y/N   If yes, they may be showing       signs of postpartum depression and should call provider.  There is also a        depression test on page C5 in their discharge booklet they can take. 13. Do you have any other questions or concerns I can address today?  Y/N  ______________      _________________________________________________________________________    Information provided during call :_________________________________________________  ___________________________________________________________________________    Call completed by:____________________________    Date:_________ Time:___________

## 2023-03-07 NOTE — DISCHARGE INSTRUCTIONS
Thank you for the opportunity to care for you and your family. We hope that you are happy with the care we provided during your stay in the [facility]. We want to ensure that you have the help you need when you leave the hospital. If there is anything we can assist you with, please let us know. Breastfeeding mothers may contact our lactation specialists with any problems or questions. [facility] lactation services phone number is [phone]. Leave a message and your call will be returned. Please refer to the information provided in the postpartum care booklet. The following are warning signs to remember. Call 911 if you have:    Chest pain or pressure  Shortness of breath, even at rest  Thoughts of harming yourself or others  Seizures    Call your healthcare provider if you have:    Temperature of 100.4 degrees or higher  Stitches that are not healing        -- Swelling, bleeding, drainage, foul odor, redness or warmth in/around your           stitches, staples, or incision (scar)        -- Bad smelling blood or discharge from the vagina  Vaginal bleeding that has increased         -- Soaking through one pad in an hour        -- You are passing clots larger than the size of a lemon  Red, warm tender area(s) in your breast or calf  Headache that does not get better, even after taking medicine; or headache with vision changes    Remember to notify all healthcare providers from your date of delivery to up to one year after giving birth! CARING FOR YOURSELF        DIET/ACTIVITY    Eat a well balanced diet focusing on foods high in fiber and protein. Drink plenty of fluids, especially water. To avoid constipation you may take a mild stool softener as recommended by your doctor or midwife. Gradually increase your activity. Resume an exercise regime only after being advised by your doctor or midwife. When sitting or lying down, keep your legs elevated to reduce swelling.   Avoid lifting anything heavier than your baby or a gallon of milk.  Avoid driving for two weeks or while taking narcotics.  No sexual intercourse for 6 weeks, or until advised by your OB provider. Nothing in the vagina: intercourse, tampons or douching.  Be prepared to discuss family planning at your follow up OB visit.  If your feel tired and have a lack of energy, you may continue to take your prenatal vitamins.  Nap when your baby naps to catch on sleep.    EMOTIONS    You may feel juárez, sad, teary and overwhelmed. Contact your OB provider if you think you may be showing signs of postpartum depression.  Please refer to the “Going Home” tab in your discharge binder.   If your baby will not stop crying, contact another adult to help or place the baby in its crib on its back and take a break. Never shake your baby!    JUN CARE     Vaginal bleeding will decrease in amount over the next few weeks. Cleanse your perineum from front to back using the jun-bottle after toileting until bleeding stops.  You will notice that as your activity increases, your flow may also increase. This is a sign that you need to rest more often. Call your OB provider if you are saturating more than 1 maxi pad an hour and resting does not help.  If used, stiches will dissolve in 4-6 weeks. To ease pain or swelling, use prescribed medications properly or use a sitz bath, if ordered.  Kegel exercises will help to restore bladder control.      BREAST CARE    FOR BREASTFEEDING MOMS:    If you become engorged, feeding may be more difficult or painful in 1 to 2 days. You may find it helpful to hand express some milk so that the infant can latch on more easily.   While breastfeeding continue to take your prenatal vitamins as directed.  Refer to the breastfeeding information in the discharge binder.      FOR NON-BREASTFEEDING MOMS:    You may apply ice packs to your breasts over your bra for 20 minutes at a time for comfort.  Do not express breast  milk.  Avoid stimulation to your breasts. When showering, allow the water to strike only your back. Wear a good fitting bra, such as a sports bra, until your milk dries up    [id number]    Your abdomen is tender to the touch or you have pain that cannot be relieved. Flu-like symptoms such as achy muscles and joints or you are experiencing extreme weakness or dizziness. Persistent burning or increased urgency in urination. LITERATURE PROVIDED    For Moms and Those Who Care About Them  Your 's Healthy Start, Grow Smart  Breastfeeding Best for [facility]. [facility] ([facility]) Parent Information About Universal Hearing Screening  Controlling pain. I have received the educational material listed above. The [place] [employer] has provided me with the opportunity to view instructional videos pertaining to infant care and the care of myself. I verify that I have received the above information and have no further questions and feel confident to care for myself and my baby. For more information about postpartum care, baby care and feeding, create a [address] My Chart account, sign in and search using the magnifying glass, typing in postpartum, breast feeding or formula feeding. [email or web address]

## 2023-03-07 NOTE — LACTATION NOTE
This note was copied from a baby's chart. Lactation Progress Note      Data:    RN requesting [facility] assistance with primip breast feeder. Mob is attempting to feed baby but baby is very angry. Baby was in SCN over night for phototherapy. Baby continues with good output and weight loss is 6.7 %. Breasts are asymmetric and baby does better on the left. Currently trying to latch to the right breast.      Action: LC assisted with calming tips and able to get a few suck bursts on the right but baby still crying and on and off. Switched baby to the left breast and after several attempt able to achieve a SIOBHAN with SRS and AS. Encouraged to continue to offer both breasts but if baby is angry, use the left breast and perhaps, put the right. Explained that the right breast may not produce as much as the left due to smaller size. Left breast will hopefully produce adequate milk. Reviewed the well fed baby check list. Stressed the importance of monitoring baby's output and weight. Discouraged paci and bottle use while baby is learning and establishing her milk supply. [facility] clinic for f/u prn. Response: Pleased with feed once baby calmed. Verbalized and demonstrated understanding. May need further assistance with breast feeding before d/c.

## 2023-03-07 NOTE — DISCHARGE SUMMARY
Obstetrical Discharge Form    Gestational Age: 39w3d    Antepartum complications: gestational hypertension    Date of Delivery: 3/7/2023      Type of Delivery: vaginal, spontaneous    Delivered By: Rubin Summers     Baby:      Information for the patient's :  Kevin Chavira [[id number]]   APGAR One: 6   Information for the patient's :  Kevin Chavira [[id number]]   APGAR Five: 9   Information for the patient's :  Kevin Gonzales [[id number]]   Birth Weight: 7 lb 3.5 oz (3.275 kg)     Anesthesia: Local and Epidural    Intrapartum complications: 3rd degree perineal laceration    Postpartum complications: none    Discharge Medication:      Medication List        START taking these medications      acetaminophen 500 MG tablet  Commonly known as: TYLENOL  Take 2 tablets by mouth every 8 hours as needed for Pain     docusate 100 MG Caps  Commonly known as: COLACE, DULCOLAX  Take 100 mg by mouth 2 times daily            CONTINUE taking these medications      PRENATAL 1+1 PO            STOP taking these medications      famotidine 20 MG tablet  Commonly known as: Pepcid     norethindrone-ethinyl estradiol 1-20 MG-MCG per tablet  Commonly known as: Ellen Doing FE                Where to Get Your Medications        You can get these medications from any pharmacy    You don't need a prescription for these medications  acetaminophen 500 MG tablet  docusate 100 MG Caps          Discharge Condition:  good    Discharge Date: 3/7/23    PLAN:  Nothing in the vagina for 6 weeks. 500 additional calories a day while breastfeeding as well as increased fluid intake while breastfeeding. Gave anticipatory guidance on breastfeeding. Bowel support as needed. Discussed wound care and use of analgesics. Discussed warning signs of pre-e and postpartum mood disturbances. Call with fever, severe pain, foul smelling odor, heavy vaginal bleeding.    Follow up in office in 6 weeks, or sooner as clinically indicated. All questions answered    D/C summary begun at delivery for D/C planning purposes, any delay in discharge from ordered D/C date due to  factors.      EDISON Bass CNM

## 2023-03-07 NOTE — PLAN OF CARE
Problem: Postpartum  Goal: Experiences normal postpartum course  Description:  Postpartum OB-Pregnancy care plan goal which identifies if the mother is experiencing a normal postpartum course  Outcome: Progressing  Goal: Appropriate maternal -  bonding  Description:  Postpartum OB-Pregnancy care plan goal which identifies if the mother and  are bonding appropriately  Outcome: Progressing  Goal: Establishment of infant feeding pattern  Description:  Postpartum OB-Pregnancy care plan goal which identifies if the mother is establishing a feeding pattern with their   Outcome: Progressing  Goal: Incisions, wounds, or drain sites healing without S/S of infection  Outcome: Progressing  Flowsheets (Taken 3/6/2023 2015)  Incisions, Wounds, or Drain Sites Healing Without Sign and Symptoms of Infection:   TWICE DAILY: Assess and document dressing/incision, wound bed, drain sites and surrounding tissue   Implement wound care per orders     Problem: Pain  Goal: Verbalizes/displays adequate comfort level or baseline comfort level  Outcome: Progressing  Flowsheets (Taken 3/6/2023 2015)  Verbalizes/displays adequate comfort level or baseline comfort level:   Encourage patient to monitor pain and request assistance   Assess pain using appropriate pain scale   Administer analgesics based on type and severity of pain and evaluate response   Implement non-pharmacological measures as appropriate and evaluate response   Consider cultural and social influences on pain and pain management   Notify Licensed Independent Practitioner if interventions unsuccessful or patient reports new pain     Problem: Infection - Adult  Goal: Absence of infection at discharge  Outcome: Progressing  Goal: Absence of infection during hospitalization  Outcome: Progressing  Goal: Absence of fever/infection during anticipated neutropenic period  Outcome: Progressing     Problem: Safety - Adult  Goal: Free from fall injury  Outcome: Progressing     Problem: Discharge Planning  Goal: Discharge to home or other facility with appropriate resources  Outcome: Progressing     Problem: Chronic Conditions and Co-morbidities  Goal: Patient's chronic conditions and co-morbidity symptoms are monitored and maintained or improved  Outcome: Progressing

## 2023-03-07 NOTE — FLOWSHEET NOTE
Mom discharged will remain to care for her infant. D/C instructions reviewed in detail with pt who voiced a good understanding of same.

## 2023-03-07 NOTE — PLAN OF CARE
Problem: Postpartum  Goal: Experiences normal postpartum course  Description:  Postpartum OB-Pregnancy care plan goal which identifies if the mother is experiencing a normal postpartum course  3/7/2023 0728 by Radha Crespo RN  Outcome: Progressing  3/7/2023 0310 by Talia Acosta RN  Outcome: Progressing  Goal: Appropriate maternal -  bonding  Description:  Postpartum OB-Pregnancy care plan goal which identifies if the mother and  are bonding appropriately  3/7/2023 0728 by Radha Crespo RN  Outcome: Progressing  3/7/2023 0310 by Talia Acosta RN  Outcome: Progressing  Goal: Establishment of infant feeding pattern  Description:  Postpartum OB-Pregnancy care plan goal which identifies if the mother is establishing a feeding pattern with their   3/7/2023 3998 by Radha Crespo RN  Outcome: Progressing  3/7/2023 0310 by Talia Acosta RN  Outcome: Progressing  Goal: Incisions, wounds, or drain sites healing without S/S of infection  3/7/2023 0728 by Rahda Crespo RN  Outcome: Progressing  3/7/2023 0310 by Talia Acosta RN  Outcome: Progressing  Flowsheets (Taken 3/6/2023 2015)  Incisions, Wounds, or Drain Sites Healing Without Sign and Symptoms of Infection:   TWICE DAILY: Assess and document dressing/incision, wound bed, drain sites and surrounding tissue   Implement wound care per orders     Problem: Pain  Goal: Verbalizes/displays adequate comfort level or baseline comfort level  3/7/2023 0728 by Radha Crespo RN  Outcome: Progressing  3/7/2023 0310 by Talia Acosta RN  Outcome: Progressing  Flowsheets (Taken 3/6/2023 2015)  Verbalizes/displays adequate comfort level or baseline comfort level:   Encourage patient to monitor pain and request assistance   Assess pain using appropriate pain scale   Administer analgesics based on type and severity of pain and evaluate response   Implement non-pharmacological measures as appropriate and evaluate response   Consider cultural and social influences on pain and pain management   Notify Licensed Independent Practitioner if interventions unsuccessful or patient reports new pain     Problem: Chronic Conditions and Co-morbidities  Goal: Patient's chronic conditions and co-morbidity symptoms are monitored and maintained or improved  3/7/2023 0728 by Fernando Green RN  Outcome: Progressing  3/7/2023 0310 by Margert Goltz, RN  Outcome: Progressing     Problem: Discharge Planning  Goal: Discharge to home or other facility with appropriate resources  3/7/2023 0728 by Fernando Green RN  Outcome: Progressing  3/7/2023 0310 by Margert Goltz, RN  Outcome: Progressing     Problem: Safety - Adult  Goal: Free from fall injury  3/7/2023 0728 by Fernando Green RN  Outcome: Progressing  3/7/2023 0310 by Margert Goltz, RN  Outcome: Progressing

## 2023-03-07 NOTE — PROGRESS NOTES
Department of Obstetrics and Gynecology  Labor and Delivery  Attending Post Partum Progress Note      SUBJECTIVE:  Pt happy and doing well. Pain is controlled with current measures. Able to ambulate without difficulty and tolerating a normal diet. Reports breastfeeding is going well.      OBJECTIVE:      Vitals:  /85   Pulse 94   Temp 98 °F (36.7 °C) (Oral)   Resp 16   Ht 5' 10\" (1.778 m)   Wt 260 lb (117.9 kg)   SpO2 99%   Breastfeeding Unknown   BMI 37.31 kg/m²     ABDOMEN:  f/m/u-1, soft  GENITAL/URINARY:  small lochia, deferred    DATA:    CBC:    Lab Results   Component Value Date/Time    WBC 16.1 2023 07:04 AM    RBC 3.97 2023 07:04 AM    HGB 12.1 2023 07:04 AM    HCT 35.7 2023 07:04 AM    MCV 89.8 2023 07:04 AM    RDW 14.7 2023 07:04 AM     2023 07:04 AM       ASSESSMENT & PLAN:      Principal Problem:    Gestational hypertension w/o significant proteinuria in 3rd trimester  Plan: Pressures have been WNL, routine follow up  Active Problems:     (normal spontaneous vaginal delivery)  Plan: Routine postpartum discharge instructions given, see discharge summary    Third degree perineal laceration  Plan: Instructed to take colace BID, ambulate, increased fiber, no straining, call with severe pain    -Continue to breastfeed  -Discharge today on PPD#2    EDISON Franco CNM

## (undated) DEVICE — NEEDLE HYPO 18GA L1.5IN PNK POLYPR HUB S STL THN WALL FILL

## (undated) DEVICE — SUTURE COAT VCRL SZ 4-0 L18IN ABSRB UD L19MM PS-2 1/2 CIR J496G

## (undated) DEVICE — COTTON UNDERCAST PADDING,CRIMPED FINISH: Brand: WEBRIL

## (undated) DEVICE — PACK EXTREMITY XR

## (undated) DEVICE — SOLUTION,SALINE,IRRGATION,500ML,STRL: Brand: MEDLINE

## (undated) DEVICE — NEEDLE HYPO 25GA L1.5IN BLU POLYPR HUB S STL REG BVL STR

## (undated) DEVICE — GLOVE SURG SZ 7 L12IN FNGR THK94MIL STD WHT ISOLEX LTX FREE

## (undated) DEVICE — BANDAGE COMPR W3INXL5YD TAN BRTH SELF ADH WRP W/ HND TEAR

## (undated) DEVICE — SYRINGE MED 10ML LUERLOCK TIP W/O SFTY DISP

## (undated) DEVICE — SUTURE VCRL SZ 5-0 L18IN ABSRB UD P-3 L13MM 3/8 CIR PRIM J493H

## (undated) DEVICE — GLOVE SURG SZ 65 L12IN FNGR THK94MIL STD WHT ISOLEX LTX

## (undated) DEVICE — SUTURE VCRL + SZ 3-0 L27IN ABSRB UD L26MM SH 1/2 CIR VCP416H